# Patient Record
Sex: FEMALE | Race: ASIAN | NOT HISPANIC OR LATINO
[De-identification: names, ages, dates, MRNs, and addresses within clinical notes are randomized per-mention and may not be internally consistent; named-entity substitution may affect disease eponyms.]

---

## 2017-10-20 ENCOUNTER — HOSPITAL (OUTPATIENT)
Dept: OTHER | Age: 52
End: 2017-10-20
Attending: INTERNAL MEDICINE

## 2019-09-11 ENCOUNTER — HOSPITAL (OUTPATIENT)
Dept: OTHER | Age: 54
End: 2019-09-11
Attending: INTERNAL MEDICINE

## 2023-08-26 ENCOUNTER — OFFICE VISIT (OUTPATIENT)
Age: 58
End: 2023-08-26

## 2023-08-26 VITALS
SYSTOLIC BLOOD PRESSURE: 154 MMHG | WEIGHT: 145.8 LBS | TEMPERATURE: 98 F | HEIGHT: 64 IN | HEART RATE: 68 BPM | BODY MASS INDEX: 24.89 KG/M2 | DIASTOLIC BLOOD PRESSURE: 82 MMHG | OXYGEN SATURATION: 98 %

## 2023-08-26 DIAGNOSIS — S50.01XA CONTUSION OF RIGHT ELBOW, INITIAL ENCOUNTER: ICD-10-CM
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
## 2023-08-26 DIAGNOSIS — I10 HYPERTENSION, UNSPECIFIED TYPE: ICD-10-CM

## 2023-08-26 DIAGNOSIS — S09.90XA CLOSED HEAD INJURY, INITIAL ENCOUNTER: ICD-10-CM

## 2023-08-26 RX ORDER — AMLODIPINE BESYLATE 2.5 MG/1
2.5 TABLET ORAL 2 TIMES DAILY
COMMUNITY
Start: 2023-08-22

## 2023-08-26 RX ORDER — METHIMAZOLE 5 MG/1
2.5 TABLET ORAL DAILY
COMMUNITY
Start: 2023-08-23

## 2023-08-26 NOTE — PROGRESS NOTES
Xi Zabala (:  1965) is a 66 y.o. female, New patient, here for evaluation of the following chief complaint(s):  Fall (Slipped x last night, bump on head, right shoulder and elbow is sore)    ASSESSMENT/PLAN:    ICD-10-CM    1. Fall, initial encounter  [facility]. XXXA XR SHOULDER RIGHT (MIN 2 VIEWS)     XR ELBOW RIGHT (MIN 3 VIEWS)      2. Closed head injury, initial encounter  S09.90XA       3. Contusion of right elbow, initial encounter  S50. 01XA         Pt presents for fall that happened roughly 24 hours ago. She reports falling in the shower. She fell on the right elbow/shoulder and hit her head. Xray of elbow and shoulder ordered but pt declined. \"I don't think it's broke\". She declined elbow splint. Unable to suture d/t injury over 18 hours. I did discuss head injury and that we do not have capability of CT in urgent care. I did discuss red flag symptoms that would prompt Emergency Department evaluation. She verbs understanding. Pt is stable at discharge. She is to take Tylenol, avoi ibuprofen and use ice. Pt has hx of HTN. She is currently taking amlodipine. Elevated BP x1 in clinic. VSS. Reviewed AVS with patient. All questions answered. If symptoms worsen go to the Emergency Department. Follow up in clinic or with PCP as needed. SUBJECTIVE/OBJECTIVE:  64yo F presents for fall in the shower last night. She reports falling on her right side and hitting the back of her head. Has a wound on the right elbow. Reports tenderness to the back of head. She denies LOC, headache, vision changes, N/V, unsteady gait. She is not on any blood thinners. History provided by:  Patient   used: No    Fall  The accident occurred 12 to 24 hours ago. There was no blood loss. The point of impact was the right elbow and right shoulder. The pain is present in the head and right elbow. The symptoms are aggravated by pressure on injury.  Pertinent negatives include no headaches, hematuria, loss of

## 2023-08-27 ASSESSMENT — ENCOUNTER SYMPTOMS
VOMITING: 0
NAUSEA: 0
VISUAL CHANGE: 0
BACK PAIN: 0